# Patient Record
Sex: FEMALE | Race: WHITE | NOT HISPANIC OR LATINO | URBAN - METROPOLITAN AREA
[De-identification: names, ages, dates, MRNs, and addresses within clinical notes are randomized per-mention and may not be internally consistent; named-entity substitution may affect disease eponyms.]

---

## 2018-07-02 ENCOUNTER — OFFICE VISIT (OUTPATIENT)
Dept: URBAN - METROPOLITAN AREA CLINIC 76 | Facility: CLINIC | Age: 78
End: 2018-07-02
Payer: MEDICARE

## 2018-07-02 PROCEDURE — 99213 OFFICE O/P EST LOW 20 MIN: CPT | Performed by: OPHTHALMOLOGY

## 2018-07-02 ASSESSMENT — INTRAOCULAR PRESSURE
OD: 16
OS: 15

## 2018-07-02 NOTE — IMPRESSION/PLAN
Impression: Drusen (degenerative) of macula, bilateral: H35.363. OU. Plan: Will continue to observe condition and or symptoms.  Use of vitamins may reduce progression of ARMD.

## 2018-07-02 NOTE — IMPRESSION/PLAN
Impression: Diagnosis: Primary open-angle glaucoma, bilateral, moderate stage. Code: G78.2573. OU.  IOP OU good today
no significant diurnal variation. Plan: Continue to monitor.  Continue Lumigan QHS OU,  Timolol BID OU

## 2018-10-03 ENCOUNTER — OFFICE VISIT (OUTPATIENT)
Dept: URBAN - METROPOLITAN AREA CLINIC 76 | Facility: CLINIC | Age: 78
End: 2018-10-03
Payer: MEDICARE

## 2018-10-03 PROCEDURE — 99213 OFFICE O/P EST LOW 20 MIN: CPT | Performed by: OPHTHALMOLOGY

## 2018-10-03 ASSESSMENT — INTRAOCULAR PRESSURE
OD: 15
OS: 16

## 2018-10-03 NOTE — IMPRESSION/PLAN
Impression: Diagnosis: Primary open-angle glaucoma, bilateral, moderate stage. Code: G95.9329. OU.  IOP OU good today. no significant diurnal variation. Plan: Continue to monitor. Continue Lumigan QHS OU,  Timolol BID OU. Needs updated tests.

## 2019-01-11 ENCOUNTER — OFFICE VISIT (OUTPATIENT)
Dept: URBAN - METROPOLITAN AREA CLINIC 76 | Facility: CLINIC | Age: 79
End: 2019-01-11
Payer: MEDICARE

## 2019-01-11 PROCEDURE — 92014 COMPRE OPH EXAM EST PT 1/>: CPT | Performed by: OPHTHALMOLOGY

## 2019-01-11 PROCEDURE — 92083 EXTENDED VISUAL FIELD XM: CPT | Performed by: OPHTHALMOLOGY

## 2019-01-11 PROCEDURE — 92133 CPTRZD OPH DX IMG PST SGM ON: CPT | Performed by: OPHTHALMOLOGY

## 2019-01-11 RX ORDER — TIMOLOL MALEATE 5 MG/ML
0.5 % SOLUTION/ DROPS OPHTHALMIC
Qty: 3 | Refills: 3 | Status: INACTIVE
Start: 2019-01-11 | End: 2020-05-13

## 2019-01-11 ASSESSMENT — INTRAOCULAR PRESSURE
OD: 16
OS: 15

## 2019-01-11 NOTE — IMPRESSION/PLAN
Impression: Diagnosis: Primary open-angle glaucoma, bilateral, moderate stage. Code: K82.2402. OU. IOP OU controlled on current regimen. No changes needed. no significant diurnal variation. OCT stable compared to last. VF stable compared to last. Plan: Continue to monitor. Continue Lumigan QHS OU,  Timolol BID OU.

## 2019-04-17 ENCOUNTER — OFFICE VISIT (OUTPATIENT)
Dept: URBAN - METROPOLITAN AREA CLINIC 76 | Facility: CLINIC | Age: 79
End: 2019-04-17
Payer: MEDICARE

## 2019-04-17 DIAGNOSIS — H35.363 DRUSEN (DEGENERATIVE) OF MACULA, BILATERAL: ICD-10-CM

## 2019-04-17 DIAGNOSIS — H40.1132 PRIMARY OPEN-ANGLE GLAUCOMA, BILATERAL, MODERATE STAGE: Primary | ICD-10-CM

## 2019-04-17 PROCEDURE — 99213 OFFICE O/P EST LOW 20 MIN: CPT | Performed by: OPHTHALMOLOGY

## 2019-04-17 ASSESSMENT — INTRAOCULAR PRESSURE
OS: 12
OD: 12

## 2019-04-17 NOTE — IMPRESSION/PLAN
Impression: Diagnosis: Primary open-angle glaucoma, bilateral, moderate stage. Code: S44.5880. OU. IOP OU controlled on current regimen. No changes needed. no significant diurnal variation. Plan: Continue to monitor. Continue Lumigan QHS OU,  Timolol BID OU.

## 2019-07-25 ENCOUNTER — OFFICE VISIT (OUTPATIENT)
Dept: URBAN - METROPOLITAN AREA CLINIC 76 | Facility: CLINIC | Age: 79
End: 2019-07-25
Payer: MEDICARE

## 2019-07-25 PROCEDURE — 99213 OFFICE O/P EST LOW 20 MIN: CPT | Performed by: OPHTHALMOLOGY

## 2019-07-25 ASSESSMENT — INTRAOCULAR PRESSURE
OS: 14
OD: 16

## 2019-07-25 NOTE — IMPRESSION/PLAN
Impression: Diagnosis: Primary open-angle glaucoma, bilateral, moderate stage. Code: K34.4424. OU. IOP's fluctuating, still in good range 
no significant diurnal variation. Plan: Continue to monitor.  Continue Lumigan QHS OU, Timolol BID OU.
watch IOP

## 2019-10-29 ENCOUNTER — OFFICE VISIT (OUTPATIENT)
Dept: URBAN - METROPOLITAN AREA CLINIC 76 | Facility: CLINIC | Age: 79
End: 2019-10-29
Payer: MEDICARE

## 2019-10-29 PROCEDURE — 99213 OFFICE O/P EST LOW 20 MIN: CPT | Performed by: OPHTHALMOLOGY

## 2019-10-29 ASSESSMENT — INTRAOCULAR PRESSURE
OS: 14
OD: 15

## 2019-10-29 NOTE — IMPRESSION/PLAN
Impression: Diagnosis: Primary open-angle glaucoma, bilateral, moderate stage. Code: C68.9241. OU. IOP OU good today 
no significant diurnal variation. Plan: Continue to monitor.  Continue Lumigan QHS OU, Timolol BID OU.
watch IOP

## 2020-01-24 ENCOUNTER — OFFICE VISIT (OUTPATIENT)
Dept: URBAN - METROPOLITAN AREA CLINIC 76 | Facility: CLINIC | Age: 80
End: 2020-01-24
Payer: MEDICARE

## 2020-01-24 PROCEDURE — 92133 CPTRZD OPH DX IMG PST SGM ON: CPT | Performed by: OPHTHALMOLOGY

## 2020-01-24 PROCEDURE — 92014 COMPRE OPH EXAM EST PT 1/>: CPT | Performed by: OPHTHALMOLOGY

## 2020-01-24 PROCEDURE — 92083 EXTENDED VISUAL FIELD XM: CPT | Performed by: OPHTHALMOLOGY

## 2020-01-24 ASSESSMENT — INTRAOCULAR PRESSURE
OS: 15
OD: 16

## 2020-01-24 NOTE — IMPRESSION/PLAN
Impression: Diagnosis: Primary open-angle glaucoma, bilateral, moderate stage. Code: O70.2768. OU. IOP OU good today 
no significant diurnal variation. OCT stable compared to last.  VF stable compared to last. Plan: Continue to monitor.  Continue Lumigan QHS OU, Timolol BID OU.
watch IOP

## 2020-05-13 ENCOUNTER — OFFICE VISIT (OUTPATIENT)
Dept: URBAN - METROPOLITAN AREA CLINIC 76 | Facility: CLINIC | Age: 80
End: 2020-05-13
Payer: MEDICARE

## 2020-05-13 DIAGNOSIS — Z96.1 PRESENCE OF INTRAOCULAR LENS: ICD-10-CM

## 2020-05-13 PROCEDURE — 99213 OFFICE O/P EST LOW 20 MIN: CPT | Performed by: OPHTHALMOLOGY

## 2020-05-13 RX ORDER — TIMOLOL MALEATE 5 MG/ML
0.5 % SOLUTION/ DROPS OPHTHALMIC
Qty: 3 | Refills: 3 | Status: INACTIVE
Start: 2020-05-13 | End: 2021-07-19

## 2020-05-13 ASSESSMENT — INTRAOCULAR PRESSURE
OD: 16
OS: 14

## 2020-05-13 NOTE — IMPRESSION/PLAN
Impression: Diagnosis: Primary open-angle glaucoma, bilateral, moderate stage. Code: I73.7101. OU. IOP OU good today 
no significant diurnal variation. Plan: Continue to monitor.  Continue Lumigan QHS OU, Timolol BID OU.
watch IOP

## 2020-09-02 ENCOUNTER — OFFICE VISIT (OUTPATIENT)
Dept: URBAN - METROPOLITAN AREA CLINIC 76 | Facility: CLINIC | Age: 80
End: 2020-09-02
Payer: MEDICARE

## 2020-09-02 PROCEDURE — 99213 OFFICE O/P EST LOW 20 MIN: CPT | Performed by: OPHTHALMOLOGY

## 2020-09-02 ASSESSMENT — INTRAOCULAR PRESSURE
OD: 14
OS: 14

## 2020-09-02 NOTE — IMPRESSION/PLAN
Impression: Diagnosis: Primary open-angle glaucoma, bilateral, moderate stage. Code: Q21.0575. OU. IOP OU good today 
no significant diurnal variation. Plan: Continue to monitor. Continue Lumigan QHS OU, Timolol BID OU.
watch IOP  Needs updated tests.

## 2020-09-16 NOTE — IMPRESSION/PLAN
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, September 15, 2020  · LVEF 30%, mild LVH, grade 1 diastolic dysfunction, mild RV dilatation with normal function, mild LA dilatation, AV sclerosis, fibrocalcific changes of and the with trace MR, mild aortic root dilatation 3 5 cm, September 2020  · Normal LVEDP  · Dyspnea on exertion  · Hypertension  · Type 2 diabetes mellitus  · BPH     PLAN:  Patient is status post exploratory laparotomy and closure cystostomy for a retroperitoneal bladder leak yesterday  He has no complaints of chest discomfort or shortness of breath  Telemetry demonstrated 5 beat run of nonsustained ventricular tachycardia  Continue statin and isosorbide   Eventual restart of ARB and Coreg prior to discharge for CHF  Keep potassium greater than 4 and magnesium greater than 2; replete p r n   I have discussed the possibility of life vest prior to discharge, discussing risks and benefits  Family at bedside for this discussion  Should the patient wish to be fitted with a life vest, he will reach out to staff and we will set up the patient to be seen by the LifeVest representative  Will continue to follow inpatient p r n  Signed: Katelyn Pineda DO, [facility]      History of Present Illness:  Patient seen and examined  Denies chest pain, pressure, tightness or squeezing  Denies lightheadedness, dizziness or palpitations    Denies orthopnea or Impression: Drusen (degenerative) of macula, bilateral: H35.363. OU. Plan: Will continue to observe condition and or symptoms.  Use of vitamins may reduce progression of ARMD. paroxysmal nocturnal dyspnea  Admits to some fatigue and improving abdominal discomfort  Review of Systems:  Review of Systems   Constitution: Positive for malaise/fatigue  Negative for decreased appetite, fever, weight gain and weight loss  HENT: Negative for congestion and sore throat  Eyes: Negative for visual disturbance  Cardiovascular: Negative for chest pain, dyspnea on exertion, leg swelling, near-syncope and palpitations  Respiratory: Negative for cough and shortness of breath  Hematologic/Lymphatic: Negative for bleeding problem  Skin: Negative for rash  Musculoskeletal: Negative for myalgias and neck pain  Gastrointestinal: Negative for abdominal pain and nausea  Neurological: Negative for light-headedness and weakness  Psychiatric/Behavioral: Negative for depression  Allergies   Allergen Reactions    Demerol [Meperidine]        No current facility-administered medications on file prior to encounter        Current Outpatient Medications on File Prior to Encounter   Medication Sig    aspirin 325 mg tablet Take 325 mg by mouth daily    carvedilol (COREG) 25 mg tablet Take 0 5 tablets (12 5 mg total) by mouth 2 (two) times a day with meals    Cholecalciferol (VITAMIN D3) 5000 units CAPS Take 1 capsule by mouth    clopidogrel (PLAVIX) 75 mg tablet Take 75 mg by mouth daily    Cyanocobalamin (VITAMIN B12 PO) Take 5,000 mcg by mouth    docusate sodium (COLACE) 100 mg capsule Take 1 capsule (100 mg total) by mouth every 12 (twelve) hours    famotidine (PEPCID) 40 MG tablet Take 40 mg by mouth daily    finasteride (PROSCAR) 5 mg tablet Take 1 tablet (5 mg total) by mouth daily    furosemide (LASIX) 20 mg tablet Take 20 mg by mouth 2 (two) times a day    gabapentin (NEURONTIN) 300 mg capsule Take 300 mg by mouth 2 (two) times a day    insulin glargine (LANTUS) 100 units/mL subcutaneous injection Inject 20 Units under the skin daily at bedtime    irbesartan (AVAPRO) 150 mg tablet Take 150 mg by mouth daily at bedtime    isosorbide mononitrate (IMDUR) 30 mg 24 hr tablet Take 30 mg by mouth daily    meloxicam (MOBIC) 15 mg tablet Take 15 mg by mouth daily    metFORMIN (GLUCOPHAGE) 1000 MG tablet Take 1,000 mg by mouth 2 (two) times a day with meals    simvastatin (ZOCOR) 20 mg tablet Take 20 mg by mouth daily at bedtime    tamsulosin (FLOMAX) 0 4 mg Take 2 capsules (0 8 mg total) by mouth daily with dinner    albuterol (PROVENTIL HFA,VENTOLIN HFA) 90 mcg/act inhaler Inhale 2 puffs every 4 (four) hours as needed for wheezing    nitroglycerin (NITROSTAT) 0 4 mg SL tablet Place 0 4 mg under the tongue every 5 (five) minutes as needed for chest pain    sitaGLIPtin (JANUVIA) 100 mg tablet Take 100 mg by mouth daily        Current Facility-Administered Medications   Medication Dose Route Frequency Provider Last Rate    acetaminophen  650 mg Oral Q6H PRN Sakina Gaines MD      Or    oxyCODONE-acetaminophen  1 tablet Oral Q6H PRN Sakina Gaines MD      Or    oxyCODONE-acetaminophen  2 tablet Oral Q6H PRN Sakina Gaines MD      albuterol  2 puff Inhalation Q4H PRN Sakina Gaines MD      belladonna-opium  30 mg Rectal Q8H PRN Sakina Gaines MD      cefepime  2,000 mg Intravenous Q12H Sakina Gaines MD Stopped (09/16/20 0600)    docusate sodium  100 mg Oral Q12H Sakina Gaines MD      enoxaparin  40 mg Subcutaneous Q24H [facility] Sakina Gaines MD      famotidine  20 mg Oral Daily Sakina Gaines MD      finasteride  5 mg Oral Daily Sakina Gaines MD      gabapentin  300 mg Oral BID Sakina Gaines MD      insulin glargine  20 Units Subcutaneous HS Sakina Gaines MD      insulin lispro  1-6 Units Subcutaneous Q6H Conchis Suarez MD      isosorbide mononitrate  30 mg Oral Daily Sakina Gaines MD      morphine injection  1 mg Intravenous Q4H PRN Sakina Gaines MD      nitroglycerin  0 4 mg Sublingual Q5 Min PRN Sakina Gaines MD      oxybutynin  5 mg Oral Daily Jennifer Bassett MD      potassium chloride  40 mEq Intravenous BID Lamar Mcfadden MD 40 mEq (09/16/20 0903)    pravastatin  40 mg Oral Daily With Idris Marsh MD      sodium chloride  100 mL/hr Intravenous Continuous Lamar Mcfadden  mL/hr (09/16/20 0817)    tamsulosin  0 8 mg Oral Daily With Idris Marsh MD         sodium chloride, 100 mL/hr, Last Rate: 100 mL/hr (09/16/20 0817)        Vitals:    09/16/20 0902 09/16/20 1000 09/16/20 1100 09/16/20 1120   BP:  129/56 107/55    BP Location:       Pulse:  (!) 118 (!) 112    Resp:  (!) 25 (!) 24    Temp:    98 °F (36 7 °C)   TempSrc:    Temporal   SpO2:  95% 93%    Weight: 106 kg (233 lb 7 5 oz)      Height: 5' 7" (1 702 m)            Intake/Output Summary (Last 24 hours) at 9/16/2020 1436  Last data filed at 9/16/2020 1000  Gross per 24 hour   Intake 4593 22 ml   Output 4210 ml   Net 383 22 ml       Weight change: 7 7 kg (16 lb 15 6 oz)    PHYSICAL EXAMINATION:  Gen: Awake, Alert, NAD  HEENT: AT/NC, Anicteric, mmm  Neck: Supple, No elevated JVP  Resp: CTA bilaterally no w/r/r  CV: RRR +S1, S2, No m/r/g  Abd: Soft, obese NT/ND + BS  Ext: warm, no LE edema bilaterally  Neuro: Follows commands, moves all extermities  Psych: Appropriate affect    Lab Results:  Results from last 7 days   Lab Units 09/16/20  0455   WBC Thousand/uL 18 13*   HEMOGLOBIN g/dL 8 1*   HEMATOCRIT % 24 9*   PLATELETS Thousands/uL 151     Results from last 7 days   Lab Units 09/16/20  0455 09/15/20  1858  09/14/20  1905   POTASSIUM mmol/L 3 1* 3 7   < >  --    CHLORIDE mmol/L 109* 105   < >  --    CO2 mmol/L 22 19*   < >  --    CO2, I-STAT mmol/L  --   --   --  29   BUN mg/dL 10 12   < >  --    CREATININE mg/dL 0 96 1 04   < >  --    CALCIUM mg/dL 6 8* 7 1*   < >  --    ALK PHOS U/L  --  53  --   --    ALT U/L  --  16  --   --    AST U/L  --  12  --   --    GLUCOSE, ISTAT mg/dl  --   --   --  140    < > = values in this interval not displayed       No results found for: TROPONINT                Tele: SR to ST, rare 5 beats NSVT, occ VPC and APCs    This note was completed in part utilizing M-Modal Fluency Direct Software  Grammatical errors, random word insertions, spelling mistakes, and incomplete sentences may be an occasional consequence of this system secondary to software limitations, ambient noise, and hardware issues  If you have any questions or concerns about the content, text, or information contained within the body of this dictation, please contact the provider for clarification

## 2021-01-18 ENCOUNTER — OFFICE VISIT (OUTPATIENT)
Dept: URBAN - METROPOLITAN AREA CLINIC 76 | Facility: CLINIC | Age: 81
End: 2021-01-18
Payer: MEDICARE

## 2021-01-18 DIAGNOSIS — H35.361 DRUSEN OF MACULA OF RIGHT EYE: ICD-10-CM

## 2021-01-18 PROCEDURE — 92083 EXTENDED VISUAL FIELD XM: CPT | Performed by: OPTOMETRIST

## 2021-01-18 PROCEDURE — 76514 ECHO EXAM OF EYE THICKNESS: CPT | Performed by: OPTOMETRIST

## 2021-01-18 PROCEDURE — 92133 CPTRZD OPH DX IMG PST SGM ON: CPT | Performed by: OPTOMETRIST

## 2021-01-18 PROCEDURE — 99203 OFFICE O/P NEW LOW 30 MIN: CPT | Performed by: OPTOMETRIST

## 2021-01-18 ASSESSMENT — INTRAOCULAR PRESSURE
OS: 15
OD: 16

## 2021-01-18 NOTE — IMPRESSION/PLAN
Impression: Diagnosis: Primary open-angle glaucoma, bilateral, moderate stage. Code: J99.4292. OU. VF WNL OU. OCT OD: mild NFL thinning inf. stable OS: WNL PACHS: normal IOP: stable OU. Plan: Continue to monitor. Continue Lumigan QHS OU, Timolol BID OU.

## 2021-05-19 ENCOUNTER — OFFICE VISIT (OUTPATIENT)
Dept: URBAN - METROPOLITAN AREA CLINIC 76 | Facility: CLINIC | Age: 81
End: 2021-05-19
Payer: MEDICARE

## 2021-05-19 PROCEDURE — 99212 OFFICE O/P EST SF 10 MIN: CPT | Performed by: OPTOMETRIST

## 2021-05-19 ASSESSMENT — INTRAOCULAR PRESSURE
OS: 16
OD: 16

## 2021-05-19 NOTE — IMPRESSION/PLAN
Impression: Diagnosis: Primary open-angle glaucoma, bilateral, moderate stage. Code: H00.2475. OU. IOP good today. Plan: Discussed with patient. Continue to monitor. Continue Lumigan QHS OU, Timolol BID OU.

## 2021-09-22 ENCOUNTER — OFFICE VISIT (OUTPATIENT)
Dept: URBAN - METROPOLITAN AREA CLINIC 76 | Facility: CLINIC | Age: 81
End: 2021-09-22
Payer: MEDICARE

## 2021-09-22 PROCEDURE — 99213 OFFICE O/P EST LOW 20 MIN: CPT | Performed by: OPTOMETRIST

## 2021-09-22 ASSESSMENT — INTRAOCULAR PRESSURE
OS: 14
OD: 14

## 2021-09-22 NOTE — IMPRESSION/PLAN
Impression: Diagnosis: Primary open-angle glaucoma, bilateral, moderate stage. Code: U29.6814. OU. IOP good today. Plan: Discussed with patient. Continue to monitor. Continue Lumigan QHS OU, Timolol BID OU.

## 2022-02-02 ENCOUNTER — OFFICE VISIT (OUTPATIENT)
Dept: URBAN - METROPOLITAN AREA CLINIC 76 | Facility: CLINIC | Age: 82
End: 2022-02-02
Payer: MEDICARE

## 2022-02-02 DIAGNOSIS — H35.3131 NONEXUDATIVE AGE-RELATED MACULAR DEGENERATION, BILATERAL, EARLY DRY STAGE: ICD-10-CM

## 2022-02-02 PROCEDURE — 99214 OFFICE O/P EST MOD 30 MIN: CPT | Performed by: OPTOMETRIST

## 2022-02-02 PROCEDURE — 92083 EXTENDED VISUAL FIELD XM: CPT | Performed by: OPTOMETRIST

## 2022-02-02 PROCEDURE — 92133 CPTRZD OPH DX IMG PST SGM ON: CPT | Performed by: OPTOMETRIST

## 2022-02-02 ASSESSMENT — INTRAOCULAR PRESSURE
OS: 14
OD: 15

## 2022-03-03 NOTE — IMPRESSION/PLAN
Consultation - Cardiology   [address] [facility]     Sarai Pemberton 82 y o  male MRN: [id number]  : 1937  Unit/Bed#: ED CT1 Encounter: [id number]      Assessment & Plan     1  AFib with slow ventricular response  Patient's heart rate is slow around 40 beats per minute  Most likely etiology is patient continue taking his digoxin and beta-blocker and possibly calcium channel blocker in the setting of acute kidney injury  Hold all the medications  Will check echo Doppler continue hydration  And monitor input output  At this time there is no indication for pacemaker  His vitals are slowly improving last blood pressure when I was in the room was around 100/60     2  Dizziness and lightheadedness due to volume depletion and bradycardia  As above will likely to improve    3  Type 2 diabetes mellitus  Management as per medical team    4  Dyslipidemia  Hold all the medications can resume statins once his all labs are acceptable    5  History of benign prostate hypertrophy and TURP     6  Acute kidney injury which may be made worse by continue sleep taking his diuretics in the setting of diarrhea  Continue hydration  His potassium is 6 0  He is already given calcium gluconate by the ED  Addendum to above  Patient's echo Doppler shows normal LV systolic function with mild AI, mild MR but severe TR  Severely dilated right-sided chambers  Thank you for your consultation  Physician Requesting Consult: DO Kenny    Reason for Consult / Principal Problem:  Weakness not feeling well and bradycardia    Inpatient consult to Cardiology  Consult performed by: Omar Miller MD  Consult ordered by: DO Kenny          HPI: Sarai Pemberton is a 82y o  year old male who presents with dizziness not feeling well and was found to be bradycardic    [facility] has a history of chronic atrial fibrillation managed with rate control, BPH be with Impression: Drusen (degenerative) of macula, bilateral: H35.363. OU. Plan: Will continue to observe condition and or symptoms.  Use of vitamins may reduce progression of ARMD. history of TURP, history of kidney stone, dyslipidemia, type 2 diabetes mellitus, chronic Coumadin therapy who was seen by patient's cardiologist as per patient's daughter-in-law who was present and provided history and his calcium channel blocker was changed to carvedilol  Of the last few days patient was having diarrhea and weakness he himself also took Cardizem CD 1 20 mg daily along with already taking his digoxin which he takes 5 days a week and not sure about carvedilol  He felt dizzy and lightheaded and was brought to the emergency room where he was found to be bradycardic and his lab shows he is having acute kidney injury and his potassium was 6 0  His given IV fluid at this time and his INR is found to be elevated  Patient himself admits that he does not drink much water because he has to go to the bathroom multiple times  Review of Systems   Constitutional: Positive for fatigue  Negative for activity change, chills, diaphoresis, fever and unexpected weight change  HENT: Negative for congestion  Eyes: Negative for discharge and redness  Respiratory: Negative for cough, chest tightness, shortness of breath and wheezing  Cardiovascular: Negative  Negative for chest pain, palpitations and leg swelling  Gastrointestinal: Negative for abdominal pain, diarrhea and nausea  Endocrine: Negative  Genitourinary: Negative for decreased urine volume and urgency  Musculoskeletal: Negative  Negative for arthralgias, back pain and gait problem  Skin: Negative for rash and wound  Allergic/Immunologic: Negative  Neurological: Positive for dizziness, weakness and light-headedness  Negative for seizures, syncope and headaches  Hematological: Negative  Psychiatric/Behavioral: Negative for agitation and confusion  The patient is nervous/anxious          Historical Information   Past Medical History:   Diagnosis Date    Asymptomatic microscopic hematuria     BPH with obstruction/lower urinary tract symptoms     BPH without obstruction/lower urinary tract symptoms     Cardiac disease     Elevated PSA     Family history of malignant neoplasm of prostate     Feeling of incomplete bladder emptying     GERD (gastroesophageal reflux disease)     Hyperlipidemia     Kidney stone     Nocturia     Other specified disorders of kidney and ureter     Personal history of urinary calculi      Past Surgical History:   Procedure Laterality Date    BACK SURGERY  12/03/2019    CYSTOSCOPY  2016    PROSTATE BIOPSY  2016    TOTAL HIP ARTHROPLASTY Left 02/2021    TRANSURETHRAL RESECTION OF PROSTATE  1998     Social History     Substance and Sexual Activity   Alcohol Use No     Social History     Substance and Sexual Activity   Drug Use No     Social History     Tobacco Use   Smoking Status Never Smoker   Smokeless Tobacco Never Used     Family History:   Family History   Problem Relation Age of Onset    Other Mother         sepsis    Prostate cancer Father        Meds/Allergies    PTA meds:  (Not in a hospital admission)     No Known Allergies    Current Facility-Administered Medications:     calcium gluconate 1 g in sodium chloride 0 9% 50 mL (premix), 1 g, Intravenous, Once, Komaira Ferdous, DO    dextrose infusion 10 % bolus, 250 mL, Intravenous, Once, Komaira Ferdous, DO    insulin regular (HumuLIN R,NovoLIN R) injection 10 Units, 10 Units, Intravenous, Once, Komaira Ferdous, DO    sodium chloride 0 9 % bolus 500 mL, 500 mL, Intravenous, Once, Komaira Ferdous, DO, Last Rate: 500 mL/hr at 03/03/22 1454, 500 mL at 03/03/22 1454    Current Outpatient Medications:     atorvastatin (LIPITOR) 40 mg tablet, Take 40 mg by mouth daily  , Disp: , Rfl:     carvedilol (COREG) 12 5 mg tablet, Take 12 5 mg by mouth 2 (two) times a day with meals, Disp: , Rfl:     Cholecalciferol 5000 units capsule, 1 tab daily, Disp: , Rfl:     digoxin (Digox) 0 125 mg tablet, Take 125 mcg by mouth daily Monday to Friday only, Disp: , Rfl:     finasteride (PROSCAR) 5 mg tablet, Take 1 tablet (5 mg total) by mouth daily, Disp: 90 tablet, Rfl: 3    furosemide (LASIX) 20 mg tablet, Take 20 mg by mouth daily Take 2 times a week   On Tuesday and Friday, Disp: , Rfl:     iron polysaccharides (FERREX) 150 mg capsule, Take 150 mg by mouth 2 (two) times a day, Disp: , Rfl:     metFORMIN (GLUCOPHAGE-XR) 500 mg 24 hr tablet, Take 500 mg by mouth daily with breakfast, Disp: , Rfl:     tamsulosin (FLOMAX) 0 4 mg, Take 1 capsule (0 4 mg total) by mouth daily at bedtime, Disp: 90 capsule, Rfl: 3    warfarin (COUMADIN) 5 mg tablet, Take 5 mg by mouth daily  , Disp: , Rfl:     VTE Pharmacologic Prophylaxis:   Coumadin    Objective:   Vitals: Blood pressure 101/57, pulse (!) 40, temperature 97 6 °F (36 4 °C), temperature source Tympanic, resp  rate 21, weight 74 6 kg (164 lb 7 4 oz), SpO2 96 %  Body mass index is 24 64 kg/m²    Wt Readings from Last 3 Encounters:   03/03/22 74 6 kg (164 lb 7 4 oz)   07/26/21 76 2 kg (168 lb)   07/23/20 73 9 kg (163 lb)     BP Readings from Last 3 Encounters:   03/03/22 101/57   07/26/21 130/64   07/23/20 138/70     Orthostatic Blood Pressures      Most Recent Value   Blood Pressure 101/57 filed at 03/03/2022 1450   Patient Position - Orthostatic VS Sitting filed at 03/03/2022 1431        No intake or output data in the 24 hours ending 03/03/22 1514    Invasive Devices  Report    Peripheral Intravenous Line            Peripheral IV 03/03/22 Right Antecubital <1 day                  Physical Exam:   Physical Exam    Neurologic:  Alert & oriented x 3, no new focal deficits, Not in any acute distress,  Constitutional:  Adequate built, non-toxic appearance   Neck: Normal range of motion, no tenderness,  Neck supple   Respiratory:  Bilateral air entry, mostly clear to auscultation anteriorly  Cardiovascular: S1-S2 irregularly bradycardic   GI:  Soft, nondistended,  nontender, no hepatosplenomegaly appreciated  Musculoskeletal:  No edema, no tenderness, no deformities  Skin:  Well hydrated, no rash   Extremities:  No edema and distal pulses are present  Psychiatric:  Speech and behavior appropriate     Labs:   Troponins:      CBC with diff:       Invalid input(s):  RBC, TOTALCELLSCOUNTED, SEGS%, GRANS%, LYMPHS%, EOS%, BASO%, ABNEUT, ABGRANS, ABLYMPHS, ABMOMOS, ABEOS, ABBASO    CMP:   Results from last 7 days   Lab Units 03/03/22  1436   SODIUM mmol/L 141   POTASSIUM mmol/L 6 0*   CHLORIDE mmol/L 107   CO2 mmol/L 22   ANION GAP mmol/L 12   BUN mg/dL 36*   CREATININE mg/dL 1 49*   CALCIUM mg/dL 8 2*   AST U/L 48*   ALT U/L 22   ALK PHOS U/L 107   TOTAL PROTEIN g/dL 6 0*   ALBUMIN g/dL 3 2*   TOTAL BILIRUBIN mg/dL 1 22*   EGFR ml/min/1 73sq m 43   GLUCOSE RANDOM mg/dL 134       Magnesium:   Results from last 7 days   Lab Units 03/03/22  1436   MAGNESIUM mg/dL 2 3     Coags:   Results from last 7 days   Lab Units 03/03/22  1438   PTT seconds 38*   INR  3 40*     TSH:      NT-proBNP:   Recent Labs     03/03/22  1436   NTBNP 4,527*        Imaging & Testing   Cardiac testing:         Imaging: I have personally reviewed pertinent reports  No results found  EKG/ Monitor: Personally reviewed  Atrial fibrillation with slow ventricular rate heart rate around 38-40 beats per minute  Code Status: No Order  Advance Directive and Living Will:      POLST:        Gerri Osman MD MD, [facility]      "This note has been constructed using a voice recognition system  Therefore there may be syntax, spelling, and/or grammatical errors   Please call if you have any questions  "

## 2022-08-03 ENCOUNTER — OFFICE VISIT (OUTPATIENT)
Dept: URBAN - METROPOLITAN AREA CLINIC 76 | Facility: CLINIC | Age: 82
End: 2022-08-03
Payer: MEDICARE

## 2022-08-03 DIAGNOSIS — Z96.1 PRESENCE OF INTRAOCULAR LENS: ICD-10-CM

## 2022-08-03 DIAGNOSIS — H40.1132 PRIMARY OPEN-ANGLE GLAUCOMA, BILATERAL, MODERATE STAGE: Primary | ICD-10-CM

## 2022-08-03 PROCEDURE — 99213 OFFICE O/P EST LOW 20 MIN: CPT | Performed by: OPTOMETRIST

## 2022-08-03 ASSESSMENT — INTRAOCULAR PRESSURE
OD: 16
OS: 16

## 2022-08-03 NOTE — IMPRESSION/PLAN
Impression: Diagnosis: Primary open-angle glaucoma, bilateral, moderate stage. Code: N39.9302. OU. IOP good today. - Preformed and reviewed VF and RNFL OCT 2/2/22, normal OU. Plan: Discussed with patient. Continue Lumigan QHS OU, Timolol BID OU. Apply pressure to tear ducts after instilling drops, then close eyes and roll eyes around for 10 sec. Wait 5 min between drops. Repeat DE/OCT at next visit.

## 2022-08-05 ENCOUNTER — TESTING ONLY (OUTPATIENT)
Dept: URBAN - METROPOLITAN AREA CLINIC 76 | Facility: CLINIC | Age: 82
End: 2022-08-05

## 2022-08-05 DIAGNOSIS — H52.4 PRESBYOPIA: Primary | ICD-10-CM

## 2022-08-05 ASSESSMENT — VISUAL ACUITY
OD: 20/30
OS: 20/30

## 2022-08-05 ASSESSMENT — KERATOMETRY
OD: 44.50
OS: 43.75

## 2023-02-03 ENCOUNTER — OFFICE VISIT (OUTPATIENT)
Dept: URBAN - METROPOLITAN AREA CLINIC 76 | Facility: CLINIC | Age: 83
End: 2023-02-03
Payer: MEDICARE

## 2023-02-03 DIAGNOSIS — H40.1132 PRIMARY OPEN-ANGLE GLAUCOMA, BILATERAL, MODERATE STAGE: Primary | ICD-10-CM

## 2023-02-03 PROCEDURE — 99214 OFFICE O/P EST MOD 30 MIN: CPT | Performed by: OPTOMETRIST

## 2023-02-03 PROCEDURE — 92133 CPTRZD OPH DX IMG PST SGM ON: CPT | Performed by: OPTOMETRIST

## 2023-02-03 ASSESSMENT — KERATOMETRY
OS: 44.38
OD: 44.63

## 2023-02-03 ASSESSMENT — INTRAOCULAR PRESSURE
OS: 15
OD: 15

## 2023-02-03 NOTE — IMPRESSION/PLAN
Impression: Diagnosis: Primary open-angle glaucoma, bilateral, moderate stage. Code: N60.7038. OU. IOP good today. OCT: miold NFL thinning inf, stable OD WNL OS Plan: Discussed with patient. Continue Lumigan QHS OU, Timolol BID OU. Apply pressure to tear ducts after instilling drops, then close eyes and roll eyes around for 10 sec. Wait 5 min between drops. Repeat VF at next visit.

## 2023-08-04 ENCOUNTER — OFFICE VISIT (OUTPATIENT)
Dept: URBAN - METROPOLITAN AREA CLINIC 76 | Facility: CLINIC | Age: 83
End: 2023-08-04
Payer: MEDICARE

## 2023-08-04 DIAGNOSIS — H40.1132 PRIMARY OPEN-ANGLE GLAUCOMA, BILATERAL, MODERATE STAGE: Primary | ICD-10-CM

## 2023-08-04 PROCEDURE — 99213 OFFICE O/P EST LOW 20 MIN: CPT | Performed by: OPTOMETRIST

## 2023-08-04 PROCEDURE — 92083 EXTENDED VISUAL FIELD XM: CPT | Performed by: OPTOMETRIST

## 2023-08-04 ASSESSMENT — KERATOMETRY
OD: 44.00
OS: 44.38

## 2023-08-04 ASSESSMENT — INTRAOCULAR PRESSURE
OS: 12
OD: 12

## 2024-02-13 ENCOUNTER — OFFICE VISIT (OUTPATIENT)
Dept: URBAN - METROPOLITAN AREA CLINIC 76 | Facility: CLINIC | Age: 84
End: 2024-02-13
Payer: MEDICARE

## 2024-02-13 DIAGNOSIS — H35.3191 NONEXUDATIVE AGE-RELATED MACULAR DEGENERATION, EARLY DRY STAGE: ICD-10-CM

## 2024-02-13 DIAGNOSIS — Z96.1 PRESENCE OF INTRAOCULAR LENS: ICD-10-CM

## 2024-02-13 DIAGNOSIS — H40.1132 PRIMARY OPEN-ANGLE GLAUCOMA, BILATERAL, MODERATE STAGE: Primary | ICD-10-CM

## 2024-02-13 PROCEDURE — 92134 CPTRZ OPH DX IMG PST SGM RTA: CPT | Performed by: OPTOMETRIST

## 2024-02-13 PROCEDURE — 99214 OFFICE O/P EST MOD 30 MIN: CPT | Performed by: OPTOMETRIST

## 2024-02-13 PROCEDURE — 92133 CPTRZD OPH DX IMG PST SGM ON: CPT | Performed by: OPTOMETRIST

## 2024-02-13 ASSESSMENT — INTRAOCULAR PRESSURE
OS: 13
OD: 14

## 2024-02-13 ASSESSMENT — KERATOMETRY
OS: 44.13
OD: 44.38

## 2024-07-05 ENCOUNTER — OFFICE VISIT (OUTPATIENT)
Dept: URBAN - METROPOLITAN AREA CLINIC 76 | Facility: CLINIC | Age: 84
End: 2024-07-05
Payer: MEDICARE

## 2024-07-05 DIAGNOSIS — S05.00XA CORNEAL ABRASION WITHOUT FB OF EYE, INITIAL ENCOUNTER: Primary | ICD-10-CM

## 2024-07-05 PROCEDURE — 92071 CONTACT LENS FITTING FOR TX: CPT | Performed by: OPTOMETRIST

## 2024-07-05 PROCEDURE — 99213 OFFICE O/P EST LOW 20 MIN: CPT | Performed by: OPTOMETRIST

## 2024-07-05 RX ORDER — OFLOXACIN 3 MG/ML
0.3 % SOLUTION/ DROPS OPHTHALMIC
Qty: 5 | Refills: 1 | Status: ACTIVE
Start: 2024-07-05

## 2024-07-05 RX ORDER — DOXYCYCLINE HYCLATE 100 MG/1
100 MG TABLET, COATED ORAL
Qty: 7 | Refills: 0 | Status: ACTIVE
Start: 2024-07-05

## 2024-07-05 ASSESSMENT — INTRAOCULAR PRESSURE
OS: 15
OD: 18

## 2024-07-15 ENCOUNTER — OFFICE VISIT (OUTPATIENT)
Dept: URBAN - METROPOLITAN AREA CLINIC 76 | Facility: CLINIC | Age: 84
End: 2024-07-15
Payer: MEDICARE

## 2024-07-15 DIAGNOSIS — S05.00XA CORNEAL ABRASION WITHOUT FB OF EYE, INITIAL ENCOUNTER: Primary | ICD-10-CM

## 2024-07-15 PROCEDURE — 99213 OFFICE O/P EST LOW 20 MIN: CPT | Performed by: OPTOMETRIST

## 2024-07-15 ASSESSMENT — INTRAOCULAR PRESSURE: OD: 15

## 2024-08-27 ENCOUNTER — OFFICE VISIT (OUTPATIENT)
Dept: URBAN - METROPOLITAN AREA CLINIC 76 | Facility: CLINIC | Age: 84
End: 2024-08-27
Payer: MEDICARE

## 2024-08-27 DIAGNOSIS — Z96.1 PRESENCE OF INTRAOCULAR LENS: ICD-10-CM

## 2024-08-27 DIAGNOSIS — H40.1132 PRIMARY OPEN-ANGLE GLAUCOMA, BILATERAL, MODERATE STAGE: Primary | ICD-10-CM

## 2024-08-27 PROCEDURE — 92083 EXTENDED VISUAL FIELD XM: CPT | Performed by: OPTOMETRIST

## 2024-08-27 PROCEDURE — 99213 OFFICE O/P EST LOW 20 MIN: CPT | Performed by: OPTOMETRIST

## 2024-08-27 ASSESSMENT — INTRAOCULAR PRESSURE
OS: 11
OD: 13

## 2025-01-21 ENCOUNTER — OFFICE VISIT (OUTPATIENT)
Dept: URBAN - METROPOLITAN AREA CLINIC 76 | Facility: CLINIC | Age: 85
End: 2025-01-21
Payer: MEDICARE

## 2025-01-21 DIAGNOSIS — H01.00B BLEPHARITIS OF LEFT EYE, UPPER AND LOWER EYELIDS: ICD-10-CM

## 2025-01-21 DIAGNOSIS — B88.0 DERMATITIS DUE TO DEMODEX SPECIES: ICD-10-CM

## 2025-01-21 DIAGNOSIS — H02.051 TRICHIASIS WITHOUT ENTROPION RIGHT UPPER EYELID: Primary | ICD-10-CM

## 2025-01-21 DIAGNOSIS — H01.00A BLEPHARITIS OF RIGHT EYE, UPPER AND LOWER EYELIDS: ICD-10-CM

## 2025-01-21 PROCEDURE — 67820 REVISE EYELASHES: CPT | Performed by: OPTOMETRIST

## 2025-01-21 PROCEDURE — 99214 OFFICE O/P EST MOD 30 MIN: CPT | Performed by: OPTOMETRIST

## 2025-01-21 RX ORDER — LOTILANER OPHTHALMIC SOLUTION 2.5 MG/ML
0.25 % SOLUTION/ DROPS OPHTHALMIC
Qty: 10 | Refills: 0 | Status: INACTIVE
Start: 2025-01-21 | End: 2025-03-03

## 2025-01-21 ASSESSMENT — INTRAOCULAR PRESSURE
OS: 15
OD: 15

## 2025-02-28 ENCOUNTER — OFFICE VISIT (OUTPATIENT)
Dept: URBAN - METROPOLITAN AREA CLINIC 76 | Facility: CLINIC | Age: 85
End: 2025-02-28
Payer: MEDICARE

## 2025-02-28 DIAGNOSIS — H52.4 PRESBYOPIA: ICD-10-CM

## 2025-02-28 DIAGNOSIS — H01.00B BLEPHARITIS OF LEFT EYE, UPPER AND LOWER EYELIDS: ICD-10-CM

## 2025-02-28 DIAGNOSIS — H40.1132 PRIMARY OPEN-ANGLE GLAUCOMA, BILATERAL, MODERATE STAGE: Primary | ICD-10-CM

## 2025-02-28 DIAGNOSIS — H35.3131 NONEXUDATIVE AGE-RELATED MACULAR DEGENERATION, BILATERAL, EARLY DRY STAGE: ICD-10-CM

## 2025-02-28 DIAGNOSIS — H01.00A BLEPHARITIS OF RIGHT EYE, UPPER AND LOWER EYELIDS: ICD-10-CM

## 2025-02-28 PROCEDURE — 99214 OFFICE O/P EST MOD 30 MIN: CPT | Performed by: OPTOMETRIST

## 2025-02-28 PROCEDURE — 92133 CPTRZD OPH DX IMG PST SGM ON: CPT | Performed by: OPTOMETRIST

## 2025-02-28 ASSESSMENT — INTRAOCULAR PRESSURE
OD: 13
OS: 13

## 2025-03-24 ENCOUNTER — OFFICE VISIT (OUTPATIENT)
Dept: URBAN - METROPOLITAN AREA CLINIC 76 | Facility: CLINIC | Age: 85
End: 2025-03-24

## 2025-03-24 DIAGNOSIS — H52.4 PRESBYOPIA: Primary | ICD-10-CM

## 2025-03-24 ASSESSMENT — KERATOMETRY
OS: 44.88
OD: 44.38

## 2025-03-24 ASSESSMENT — VISUAL ACUITY
OD: 20/20
OS: 20/20

## 2025-08-21 ENCOUNTER — OFFICE VISIT (OUTPATIENT)
Dept: URBAN - METROPOLITAN AREA CLINIC 76 | Facility: CLINIC | Age: 85
End: 2025-08-21
Payer: MEDICARE

## 2025-08-21 DIAGNOSIS — H40.1132 PRIMARY OPEN-ANGLE GLAUCOMA, BILATERAL, MODERATE STAGE: Primary | ICD-10-CM

## 2025-08-21 PROCEDURE — 99213 OFFICE O/P EST LOW 20 MIN: CPT | Performed by: OPTOMETRIST

## 2025-08-21 PROCEDURE — 92083 EXTENDED VISUAL FIELD XM: CPT | Performed by: OPTOMETRIST

## 2025-08-21 RX ORDER — BIMATOPROST 0.1 MG/ML
0.01 % SOLUTION/ DROPS OPHTHALMIC
Qty: 7.5 | Refills: 3 | Status: ACTIVE
Start: 2025-08-21

## 2025-08-21 ASSESSMENT — INTRAOCULAR PRESSURE
OD: 14
OS: 14